# Patient Record
Sex: FEMALE | Race: BLACK OR AFRICAN AMERICAN | Employment: OTHER | URBAN - METROPOLITAN AREA
[De-identification: names, ages, dates, MRNs, and addresses within clinical notes are randomized per-mention and may not be internally consistent; named-entity substitution may affect disease eponyms.]

---

## 2021-09-21 ENCOUNTER — HOSPITAL ENCOUNTER (EMERGENCY)
Age: 51
Discharge: HOME | End: 2021-09-21
Attending: EMERGENCY MEDICINE
Payer: MEDICAID

## 2021-09-21 VITALS
HEIGHT: 63 IN | TEMPERATURE: 98.4 F | HEART RATE: 88 BPM | DIASTOLIC BLOOD PRESSURE: 84 MMHG | OXYGEN SATURATION: 100 % | WEIGHT: 198 LBS | SYSTOLIC BLOOD PRESSURE: 127 MMHG | RESPIRATION RATE: 16 BRPM | BODY MASS INDEX: 35.08 KG/M2

## 2021-09-21 DIAGNOSIS — T20.20XA PARTIAL THICKNESS BURN OF FACE, INITIAL ENCOUNTER: ICD-10-CM

## 2021-09-21 DIAGNOSIS — T22.211A PARTIAL THICKNESS BURN OF RIGHT FOREARM, INITIAL ENCOUNTER: Primary | ICD-10-CM

## 2021-09-21 PROCEDURE — 74011000250 HC RX REV CODE- 250: Performed by: EMERGENCY MEDICINE

## 2021-09-21 PROCEDURE — 99283 EMERGENCY DEPT VISIT LOW MDM: CPT

## 2021-09-21 PROCEDURE — 74011250637 HC RX REV CODE- 250/637: Performed by: EMERGENCY MEDICINE

## 2021-09-21 RX ORDER — TETRACAINE HYDROCHLORIDE 5 MG/ML
2 SOLUTION OPHTHALMIC
Status: COMPLETED | OUTPATIENT
Start: 2021-09-21 | End: 2021-09-21

## 2021-09-21 RX ORDER — IBUPROFEN 600 MG/1
600 TABLET ORAL
Status: COMPLETED | OUTPATIENT
Start: 2021-09-21 | End: 2021-09-21

## 2021-09-21 RX ORDER — BACITRACIN 500 [USP'U]/G
OINTMENT TOPICAL 3 TIMES DAILY
Qty: 14 G | Refills: 0 | Status: SHIPPED | OUTPATIENT
Start: 2021-09-21 | End: 2021-10-01

## 2021-09-21 RX ORDER — IBUPROFEN 600 MG/1
600 TABLET ORAL
Qty: 20 TABLET | Refills: 0 | Status: SHIPPED | OUTPATIENT
Start: 2021-09-21

## 2021-09-21 RX ADMIN — IBUPROFEN 600 MG: 600 TABLET, FILM COATED ORAL at 08:39

## 2021-09-21 RX ADMIN — TETRACAINE HYDROCHLORIDE 2 DROP: 5 SOLUTION OPHTHALMIC at 07:42

## 2021-09-21 RX ADMIN — FLUORESCEIN SODIUM 1 STRIP: 1 STRIP OPHTHALMIC at 07:42
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
## 2021-09-21 NOTE — ED NOTES
Discharge, RX and Follow-up reviewed with pt. Pt verbalized understanding. NAD. Ambulated self from ED with brother.

## 2022-11-16 ENCOUNTER — TRANSCRIBE ORDER (OUTPATIENT)
Dept: SCHEDULING | Age: 52
End: 2022-11-16

## 2022-11-16 DIAGNOSIS — Z12.31 ENCOUNTER FOR SCREENING MAMMOGRAM FOR MALIGNANT NEOPLASM OF BREAST: Primary | ICD-10-CM

## 2023-04-22 DIAGNOSIS — Z12.31 ENCOUNTER FOR SCREENING MAMMOGRAM FOR MALIGNANT NEOPLASM OF BREAST: Primary | ICD-10-CM

## 2023-08-21 RX ORDER — SUMATRIPTAN 50 MG/1
50 TABLET, FILM COATED ORAL
COMMUNITY

## 2023-08-21 RX ORDER — FLUTICASONE PROPIONATE 50 MCG
1 SPRAY, SUSPENSION (ML) NASAL DAILY
COMMUNITY

## 2023-08-21 NOTE — PRE-PROCEDURE INSTRUCTIONS
PAT complete with Patient. Instructions reviewed, questions answered. NPO status, prep instructions, transportation, and arrival time (7272) reviewed.     Pt stated - Okay to review results and D/C  info with Mother/delvine - Mook Schumacher

## 2023-08-23 ENCOUNTER — HOSPITAL ENCOUNTER (OUTPATIENT)
Facility: HOSPITAL | Age: 53
Setting detail: OUTPATIENT SURGERY
Discharge: HOME | End: 2023-08-23
Attending: INTERNAL MEDICINE | Admitting: INTERNAL MEDICINE
Payer: MEDICAID

## 2023-08-23 ENCOUNTER — ANESTHESIA (OUTPATIENT)
Facility: HOSPITAL | Age: 53
End: 2023-08-23
Payer: MEDICAID

## 2023-08-23 ENCOUNTER — ANESTHESIA EVENT (OUTPATIENT)
Facility: HOSPITAL | Age: 53
End: 2023-08-23
Payer: MEDICAID

## 2023-08-23 VITALS
HEIGHT: 63 IN | BODY MASS INDEX: 37.56 KG/M2 | TEMPERATURE: 98 F | SYSTOLIC BLOOD PRESSURE: 143 MMHG | RESPIRATION RATE: 18 BRPM | HEART RATE: 83 BPM | DIASTOLIC BLOOD PRESSURE: 86 MMHG | WEIGHT: 212 LBS | OXYGEN SATURATION: 100 %

## 2023-08-23 PROCEDURE — 3600007502: Performed by: INTERNAL MEDICINE

## 2023-08-23 PROCEDURE — 3600007512: Performed by: INTERNAL MEDICINE

## 2023-08-23 PROCEDURE — 3700000000 HC ANESTHESIA ATTENDED CARE: Performed by: INTERNAL MEDICINE

## 2023-08-23 PROCEDURE — 7100000011 HC PHASE II RECOVERY - ADDTL 15 MIN: Performed by: INTERNAL MEDICINE

## 2023-08-23 PROCEDURE — 3700000001 HC ADD 15 MINUTES (ANESTHESIA): Performed by: INTERNAL MEDICINE

## 2023-08-23 PROCEDURE — 2709999900 HC NON-CHARGEABLE SUPPLY: Performed by: INTERNAL MEDICINE

## 2023-08-23 PROCEDURE — 7100000010 HC PHASE II RECOVERY - FIRST 15 MIN: Performed by: INTERNAL MEDICINE

## 2023-08-23 PROCEDURE — 6360000002 HC RX W HCPCS: Performed by: NURSE PRACTITIONER

## 2023-08-23 PROCEDURE — 2580000003 HC RX 258: Performed by: NURSE PRACTITIONER

## 2023-08-23 RX ORDER — SODIUM CHLORIDE, SODIUM LACTATE, POTASSIUM CHLORIDE, CALCIUM CHLORIDE 600; 310; 30; 20 MG/100ML; MG/100ML; MG/100ML; MG/100ML
INJECTION, SOLUTION INTRAVENOUS CONTINUOUS PRN
Status: DISCONTINUED | OUTPATIENT
Start: 2023-08-23 | End: 2023-08-23 | Stop reason: SDUPTHER

## 2023-08-23 RX ORDER — SODIUM CHLORIDE, SODIUM LACTATE, POTASSIUM CHLORIDE, CALCIUM CHLORIDE 600; 310; 30; 20 MG/100ML; MG/100ML; MG/100ML; MG/100ML
INJECTION, SOLUTION INTRAVENOUS CONTINUOUS
Status: DISCONTINUED | OUTPATIENT
Start: 2023-08-23 | End: 2023-08-23 | Stop reason: HOSPADM

## 2023-08-23 RX ADMIN — SODIUM CHLORIDE, POTASSIUM CHLORIDE, SODIUM LACTATE AND CALCIUM CHLORIDE: 600; 310; 30; 20 INJECTION, SOLUTION INTRAVENOUS at 12:13

## 2023-08-23 RX ADMIN — PROPOFOL 50 MG: 10 INJECTION, EMULSION INTRAVENOUS at 12:19

## 2023-08-23 RX ADMIN — PROPOFOL 50 MG: 10 INJECTION, EMULSION INTRAVENOUS at 12:22

## 2023-08-23 RX ADMIN — PROPOFOL 100 MG: 10 INJECTION, EMULSION INTRAVENOUS at 12:16

## 2023-08-23 ASSESSMENT — PAIN - FUNCTIONAL ASSESSMENT: PAIN_FUNCTIONAL_ASSESSMENT: 0-10

## 2023-08-23 NOTE — PROGRESS NOTES
1235: Patient to [facility] recovery. Snack/drink given. Mom at bedside, call bell within reach. Will continue to monitor. [phone]: Assisted patient to the restroom. 1258: PIV removed without difficulty, tip intact.

## 2023-08-23 NOTE — ANESTHESIA POSTPROCEDURE EVALUATION
Department of Anesthesiology  Postprocedure Note    Patient: Barbar Lennox  MRN: [id number]  YOB: 1970  Date of evaluation: 8/23/2023      Procedure Summary     Date: 08/23/23 Room / Location: [facility] ENDO 02 / [facility] ENDOSCOPY    Anesthesia Start: 1213 Anesthesia Stop: 1225    Procedure: COLONOSCOPY (Lower GI Region) Diagnosis:       Screen for colon cancer      (Screen for colon cancer [Z12.11])    Surgeons: Laureen Dey MD Responsible Provider: Feliz Pugh MD    Anesthesia Type: MAC ASA Status: 2          Anesthesia Type: No value filed. Cha Phase I: Cha Score: 10    Cha Phase II:        Anesthesia Post Evaluation    Patient location during evaluation: bedside (Endoscopy Unit)  Patient participation: complete - patient participated  Level of consciousness: sleepy but conscious  Pain score: 0  Airway patency: patent  Nausea & Vomiting: no nausea and no vomiting  Complications: no  Cardiovascular status: hemodynamically stable  Respiratory status: acceptable  Hydration status: stable  Comments: This patient remained on the stretcher. The patient was handed off to the endoscopy nursing team.  All questions regarding pre-, intra-, and postoperative care were answered.   Multimodal analgesia pain management approach

## 2023-08-23 NOTE — PROGRESS NOTES
IV removed-- tip intact, no redness, swelling or bleeding noted. VSS. Patient in no acute distress. DC instructions reviewed with patient and mother     -- verbalized understanding. Patient wheeled out to private vehicle-- no distress noted.

## (undated) DEVICE — CANNULA NSL O2 AD 7 FT END-TIDAL CARBON DIOX VENTFLO

## (undated) DEVICE — MASK O2 MED AD 7 FT 3 IN 1 W/ STD CONN LTX

## (undated) DEVICE — MASK ANES INF SZ 2 PREM TAIL VLV INFL PRT UNSCENTED SGL PT

## (undated) DEVICE — KIT ENDOSCOPIC  PROC VIA